# Patient Record
Sex: FEMALE | Race: ASIAN | NOT HISPANIC OR LATINO
[De-identification: names, ages, dates, MRNs, and addresses within clinical notes are randomized per-mention and may not be internally consistent; named-entity substitution may affect disease eponyms.]

---

## 2021-10-21 PROBLEM — Z00.00 ENCOUNTER FOR PREVENTIVE HEALTH EXAMINATION: Status: ACTIVE | Noted: 2021-10-21

## 2021-10-28 ENCOUNTER — APPOINTMENT (OUTPATIENT)
Dept: CARDIOLOGY | Facility: CLINIC | Age: 41
End: 2021-10-28
Payer: COMMERCIAL

## 2021-10-28 VITALS
HEIGHT: 63 IN | BODY MASS INDEX: 23.57 KG/M2 | SYSTOLIC BLOOD PRESSURE: 116 MMHG | TEMPERATURE: 97.6 F | HEART RATE: 63 BPM | RESPIRATION RATE: 18 BRPM | WEIGHT: 133 LBS | DIASTOLIC BLOOD PRESSURE: 79 MMHG | OXYGEN SATURATION: 98 %

## 2021-10-28 DIAGNOSIS — R06.02 SHORTNESS OF BREATH: ICD-10-CM

## 2021-10-28 PROCEDURE — 99203 OFFICE O/P NEW LOW 30 MIN: CPT

## 2021-11-03 NOTE — REASON FOR VISIT
[FreeTextEntry1] : 41 year-old female with no significant PMH presents for evaluation of SOB. Patient reports SOB with exertion for the past 1- 2 months. Patient also reports palpitations with exertion. Patient reports also reports palpitations when anxious. Patient denies CP. Patient denies h/o syncope. Patient had CXR 2 weeks ago. I advised patient to undergo an echocardiogram and a treadmill stress test.

## 2021-11-04 ENCOUNTER — APPOINTMENT (OUTPATIENT)
Dept: CARDIOLOGY | Facility: CLINIC | Age: 41
End: 2021-11-04
Payer: COMMERCIAL

## 2021-11-04 VITALS — SYSTOLIC BLOOD PRESSURE: 112 MMHG | DIASTOLIC BLOOD PRESSURE: 67 MMHG | TEMPERATURE: 97.6 F

## 2021-11-04 PROCEDURE — 93306 TTE W/DOPPLER COMPLETE: CPT

## 2021-11-04 PROCEDURE — 93015 CV STRESS TEST SUPVJ I&R: CPT

## 2024-09-25 ENCOUNTER — APPOINTMENT (OUTPATIENT)
Dept: CARDIOLOGY | Facility: CLINIC | Age: 44
End: 2024-09-25
Payer: COMMERCIAL

## 2024-09-25 ENCOUNTER — NON-APPOINTMENT (OUTPATIENT)
Age: 44
End: 2024-09-25

## 2024-09-25 VITALS
BODY MASS INDEX: 24.09 KG/M2 | WEIGHT: 136 LBS | RESPIRATION RATE: 16 BRPM | DIASTOLIC BLOOD PRESSURE: 71 MMHG | OXYGEN SATURATION: 97 % | HEART RATE: 72 BPM | SYSTOLIC BLOOD PRESSURE: 108 MMHG

## 2024-09-25 DIAGNOSIS — R06.00 DYSPNEA, UNSPECIFIED: ICD-10-CM

## 2024-09-25 DIAGNOSIS — R00.2 PALPITATIONS: ICD-10-CM

## 2024-09-25 PROCEDURE — 93000 ELECTROCARDIOGRAM COMPLETE: CPT | Mod: 59

## 2024-09-25 PROCEDURE — 99214 OFFICE O/P EST MOD 30 MIN: CPT | Mod: 25

## 2024-09-30 PROBLEM — R00.2 PALPITATION: Status: ACTIVE | Noted: 2024-09-30

## 2024-09-30 PROBLEM — R06.00 DYSPNEA: Status: ACTIVE | Noted: 2024-09-30

## 2024-09-30 NOTE — HISTORY OF PRESENT ILLNESS
[FreeTextEntry1] : 44F with no significant PMH p/w 1-2 weeks of dyspnea and palpitation. Those complaints are not related to exertion and typically occurs at rest. No chest pain. Of note, pt had similar complaints 2-3 years, had cardiovascular evaluation which were reportedly normal.   Cardiac medications= none EKG= NSR, nonspecific ST-T changes ECHO= scheduled  Stress test= scheduled  LHC= none  Recent hospitalization/ER visit= none  No significant family history No significant social history  Assessment and Plan 1. Dyspnea  2. Non-specific chest discomfort   -given the frequency of her symptoms (almost daily), I recommend 7 days event monitoring and will schedule her for TTE ECHO and exercise stress test further evaluate her symptoms.   During non face-to-face time, I reviewed relevant portions of the patient's medical record. During face-to-face time, I took a relevant history and examined the patient. I also explained differential diagnoses, relevant cardiac diagnoses, workup, and management plan, which required a moderate level of medical decision making. I answered all questions related to the patient's medical conditions.  Ric Martinez MD, FACC.  Attending Interventional Cardiologist and General Cardiologist  [facility]

## 2024-10-01 ENCOUNTER — APPOINTMENT (OUTPATIENT)
Dept: CARDIOLOGY | Facility: CLINIC | Age: 44
End: 2024-10-01

## 2024-10-01 ENCOUNTER — APPOINTMENT (OUTPATIENT)
Dept: CARDIOLOGY | Facility: CLINIC | Age: 44
End: 2024-10-01
Payer: COMMERCIAL

## 2024-10-01 VITALS — DIASTOLIC BLOOD PRESSURE: 79 MMHG | SYSTOLIC BLOOD PRESSURE: 119 MMHG

## 2024-10-01 DIAGNOSIS — R07.89 OTHER CHEST PAIN: ICD-10-CM

## 2024-10-01 DIAGNOSIS — R07.9 CHEST PAIN, UNSPECIFIED: ICD-10-CM

## 2024-10-01 PROCEDURE — ZZZZZ: CPT

## 2024-10-01 PROCEDURE — 93015 CV STRESS TEST SUPVJ I&R: CPT

## 2024-10-01 PROCEDURE — 93306 TTE W/DOPPLER COMPLETE: CPT

## 2024-10-01 PROCEDURE — 93000 ELECTROCARDIOGRAM COMPLETE: CPT

## 2024-10-01 PROCEDURE — 99214 OFFICE O/P EST MOD 30 MIN: CPT | Mod: 25

## 2024-10-01 RX ORDER — METOPROLOL SUCCINATE 25 MG/1
25 TABLET, EXTENDED RELEASE ORAL DAILY
Qty: 90 | Refills: 3 | Status: ACTIVE | COMMUNITY
Start: 2024-10-01 | End: 1900-01-01

## 2024-10-01 NOTE — HISTORY OF PRESENT ILLNESS
[FreeTextEntry1] : 44F with no significant PMH p/w 1-2 weeks of dyspnea and palpitation. Pt had 7 day ziopatch done, pending results, still with occasional chest pressure. Had TTE ECHO today and exercise test, with mild chest tightness at peak exercise but no significant ST-T changes.   Cardiac medications= none EKG= NSR, mild biphasic T wave inversions  ECHO= TTE ECHO today with preserved EF and mild MR Stress test= EST today scheduled  LHC= none Recent hospitalization/ER visit= none No significant family history No significant social history  Assessment and Plan 1. Dyspnea 2. Chest tightness and chest pain   -TTE ECHO today with normal EF, mild MR. EST test pt with mild chest tightness at peak exercise. no significant ST-T changes  -given her recurrent symptoms, will recommend CCTA to define coronary anatomy  -start toprol XL 25 mg qd in the meantime  -f/u in 1 month for symptoms   During non face-to-face time, I reviewed relevant portions of the patient's medical record. During face-to-face time, I took a relevant history and examined the patient. I also explained differential diagnoses, relevant cardiac diagnoses, workup, and management plan, which required a moderate level of medical decision making. I answered all questions related to the patient's medical conditions.  Ric Martinez MD, FACC. Attending Interventional Cardiologist and General Cardiologist [facility]

## 2024-11-06 ENCOUNTER — APPOINTMENT (OUTPATIENT)
Dept: CARDIOLOGY | Facility: CLINIC | Age: 44
End: 2024-11-06